# Patient Record
Sex: MALE | NOT HISPANIC OR LATINO | URBAN - NONMETROPOLITAN AREA
[De-identification: names, ages, dates, MRNs, and addresses within clinical notes are randomized per-mention and may not be internally consistent; named-entity substitution may affect disease eponyms.]

---

## 2018-08-08 ENCOUNTER — OFFICE VISIT (OUTPATIENT)
Dept: URBAN - NONMETROPOLITAN AREA CLINIC 18 | Facility: CLINIC | Age: 63
End: 2018-08-08
Payer: COMMERCIAL

## 2018-08-08 DIAGNOSIS — H16.143 PUNCTATE KERATITIS, BILATERAL: ICD-10-CM

## 2018-08-08 DIAGNOSIS — H43.393 OTHER VITREOUS OPACITIES, BILATERAL: ICD-10-CM

## 2018-08-08 PROCEDURE — 92004 COMPRE OPH EXAM NEW PT 1/>: CPT | Performed by: OPTOMETRIST

## 2018-08-08 ASSESSMENT — INTRAOCULAR PRESSURE
OS: 14
OD: 14

## 2018-08-08 NOTE — IMPRESSION/PLAN
Impression: Type 2 diab w mild nonprlf diabetic rtnop w/o macular edema, bilateral: G55.7760. Plan: Reviewed with patient that mild retinal vascular changes are occurring from diabetes. These changes do not require treatment at this time. Will continue to observe with dilated examination in 12 months.

## 2019-10-01 ENCOUNTER — OFFICE VISIT (OUTPATIENT)
Dept: URBAN - NONMETROPOLITAN AREA CLINIC 18 | Facility: CLINIC | Age: 64
End: 2019-10-01
Payer: COMMERCIAL

## 2019-10-01 DIAGNOSIS — H35.3131 NONEXUDATIVE AGE-RELATED MACULAR DEGENERATION, BILATERAL, EARLY DRY STAGE: ICD-10-CM

## 2019-10-01 DIAGNOSIS — H52.03 HYPERMETROPIA, BILATERAL: ICD-10-CM

## 2019-10-01 DIAGNOSIS — E11.3293 TYPE 2 DIAB W MILD NONPRLF DIABETIC RTNOP W/O MACULAR EDEMA, BILATERAL: Primary | ICD-10-CM

## 2019-10-01 PROCEDURE — 99214 OFFICE O/P EST MOD 30 MIN: CPT | Performed by: OPTOMETRIST

## 2019-10-01 ASSESSMENT — VISUAL ACUITY
OS: 20/20
OD: 20/20

## 2019-10-01 ASSESSMENT — INTRAOCULAR PRESSURE
OS: 14
OD: 14

## 2019-10-01 NOTE — IMPRESSION/PLAN
Impression: Type 2 diab w mild nonprlf diabetic rtnop w/o macular edema, bilateral: E51.2976. Plan: Reviewed with patient that mild retinal vascular changes are occurring from diabetes. These changes do not require treatment at this time. Will continue to observe with dilated examination in 12 months.

## 2019-10-01 NOTE — IMPRESSION/PLAN
Impression: Hypermetropia, bilateral: H52.03. Plan: Discussed glasses prescription at patient discretion.

## 2019-10-09 ENCOUNTER — PROCEDURE (OUTPATIENT)
Dept: URBAN - NONMETROPOLITAN AREA CLINIC 18 | Facility: CLINIC | Age: 64
End: 2019-10-09

## 2019-10-09 ASSESSMENT — VISUAL ACUITY
OD: 20/20
OS: 20/20

## 2020-10-02 ENCOUNTER — OFFICE VISIT (OUTPATIENT)
Dept: URBAN - NONMETROPOLITAN AREA CLINIC 18 | Facility: CLINIC | Age: 65
End: 2020-10-02
Payer: MEDICARE

## 2020-10-02 DIAGNOSIS — H17.89 OTHER CORNEAL SCAR: ICD-10-CM

## 2020-10-02 DIAGNOSIS — E11.9 TYPE 2 DIABETES MELLITUS W/O COMPLICATION: Primary | Chronic | ICD-10-CM

## 2020-10-02 DIAGNOSIS — H25.13 AGE-RELATED NUCLEAR CATARACT, BILATERAL: ICD-10-CM

## 2020-10-02 PROCEDURE — 99214 OFFICE O/P EST MOD 30 MIN: CPT | Performed by: OPTOMETRIST

## 2020-10-02 ASSESSMENT — INTRAOCULAR PRESSURE
OS: 14
OD: 14

## 2020-10-02 NOTE — IMPRESSION/PLAN
Impression: Type 2 diabetes mellitus w/o complication: F60.6. Plan: Reviewed with patient that no retinal vascular changes are occurring from diabetes. Patient to continue care with current medication provider for diabetes management. Importance of retinal exams reviewed. Will continue to observe with dilated examination in 12 months. nontender.../soft

## 2021-10-29 ENCOUNTER — OFFICE VISIT (OUTPATIENT)
Dept: URBAN - NONMETROPOLITAN AREA CLINIC 18 | Facility: CLINIC | Age: 66
End: 2021-10-29
Payer: MEDICARE

## 2021-10-29 PROCEDURE — 99213 OFFICE O/P EST LOW 20 MIN: CPT | Performed by: OPTOMETRIST

## 2021-10-29 ASSESSMENT — INTRAOCULAR PRESSURE
OD: 15
OS: 15

## 2021-10-29 ASSESSMENT — KERATOMETRY
OS: 44.00
OD: 43.25

## 2021-10-29 ASSESSMENT — VISUAL ACUITY
OS: 20/20
OD: 20/20

## 2021-10-29 NOTE — IMPRESSION/PLAN
Impression: Type 2 diabetes mellitus w/o complication: G77.8. Plan: Reviewed with patient that no retinal vascular changes are occurring from diabetes. Patient to continue care with current medication provider for diabetes management. Importance of retinal exams reviewed. Will continue to observe with dilated examination in 12 months.

## 2021-11-16 ENCOUNTER — OFFICE VISIT (OUTPATIENT)
Dept: URBAN - NONMETROPOLITAN AREA CLINIC 18 | Facility: CLINIC | Age: 66
End: 2021-11-16
Payer: COMMERCIAL

## 2021-11-16 PROCEDURE — 92014 COMPRE OPH EXAM EST PT 1/>: CPT | Performed by: OPTOMETRIST

## 2021-11-16 ASSESSMENT — VISUAL ACUITY
OD: 20/20
OS: 20/20

## 2021-11-16 ASSESSMENT — INTRAOCULAR PRESSURE
OD: 15
OS: 15

## 2022-11-17 ENCOUNTER — OFFICE VISIT (OUTPATIENT)
Dept: URBAN - NONMETROPOLITAN AREA CLINIC 18 | Facility: CLINIC | Age: 67
End: 2022-11-17
Payer: MEDICARE

## 2022-11-17 DIAGNOSIS — H43.393 OTHER VITREOUS OPACITIES, BILATERAL: ICD-10-CM

## 2022-11-17 DIAGNOSIS — E11.3291 TYPE 2 DIAB W MILD NONPRLF DIABETIC RTNOP W/O MACULAR EDEMA, RIGHT EYE: ICD-10-CM

## 2022-11-17 DIAGNOSIS — H25.13 AGE-RELATED NUCLEAR CATARACT, BILATERAL: ICD-10-CM

## 2022-11-17 DIAGNOSIS — H40.033 ANATOMICAL NARROW ANGLE, BILATERAL: ICD-10-CM

## 2022-11-17 DIAGNOSIS — E11.3392 TYPE 2 DIAB W MODERATE NONPRLF DIAB RTNOP W/O MACULAR EDEMA, LEFT EYE: Primary | ICD-10-CM

## 2022-11-17 DIAGNOSIS — H17.89 OTHER CORNEAL SCAR: ICD-10-CM

## 2022-11-17 PROCEDURE — 99214 OFFICE O/P EST MOD 30 MIN: CPT | Performed by: OPTOMETRIST

## 2022-11-17 PROCEDURE — 92020 GONIOSCOPY: CPT | Performed by: OPTOMETRIST

## 2022-11-17 ASSESSMENT — VISUAL ACUITY
OS: 20/20
OD: 20/20

## 2022-11-17 ASSESSMENT — INTRAOCULAR PRESSURE
OS: 14
OD: 15

## 2022-11-17 NOTE — IMPRESSION/PLAN
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 Plan: See above.

## 2022-11-17 NOTE — IMPRESSION/PLAN
Impression: Type 2 diab w moderate nonprlf diab rtnop w/o macular edema, left eye: O29.9246. Plan: Reviewed with patient that moderate diabetic retinal vascular changes are occurring from diabetes. These changes do not require treatment at this time, however, the patient is aware that the condition can progress and may require additional treatment in the future. Systemic monitoring with PCP/endocrinologist is recommended to decrease risk of damage from diabetes. Will continue to observe with dilated examination in 9 months.

## 2022-11-17 NOTE — IMPRESSION/PLAN
Impression: Anatomical narrow angle, bilateral: H40.033. Plan: Signs and symptoms of angle closure reviewed with patient in detail. Gonio today. Risk of potential irreversible damage to optic nerve was discussed. These symptoms include: increased pain, redness, or vision changes. Patient educated to contact clinic immediately if any new pain, redness or changes in vision experienced for evaluation.

## 2023-08-29 ENCOUNTER — OFFICE VISIT (OUTPATIENT)
Dept: URBAN - NONMETROPOLITAN AREA CLINIC 18 | Facility: CLINIC | Age: 68
End: 2023-08-29
Payer: COMMERCIAL

## 2023-08-29 DIAGNOSIS — E11.3393 TYPE 2 DIAB W MODERATE NONPRLF DIAB RTNOP W/O MACULAR EDEMA, BILATERAL: Primary | ICD-10-CM

## 2023-08-29 DIAGNOSIS — H25.13 AGE-RELATED NUCLEAR CATARACT, BILATERAL: ICD-10-CM

## 2023-08-29 DIAGNOSIS — H40.033 ANATOMICAL NARROW ANGLE, BILATERAL: ICD-10-CM

## 2023-08-29 DIAGNOSIS — H17.89 OTHER CORNEAL SCAR: ICD-10-CM

## 2023-08-29 DIAGNOSIS — H04.123 DRY EYE SYNDROME OF BILATERAL LACRIMAL GLANDS: ICD-10-CM

## 2023-08-29 DIAGNOSIS — H43.393 OTHER VITREOUS OPACITIES, BILATERAL: ICD-10-CM

## 2023-08-29 PROCEDURE — 99213 OFFICE O/P EST LOW 20 MIN: CPT | Performed by: OPTOMETRIST

## 2023-08-29 PROCEDURE — 92250 FUNDUS PHOTOGRAPHY W/I&R: CPT | Performed by: OPTOMETRIST

## 2023-08-29 ASSESSMENT — INTRAOCULAR PRESSURE
OS: 14
OD: 15
OS: 13

## 2024-05-29 ENCOUNTER — OFFICE VISIT (OUTPATIENT)
Dept: URBAN - NONMETROPOLITAN AREA CLINIC 18 | Facility: CLINIC | Age: 69
End: 2024-05-29
Payer: COMMERCIAL

## 2024-05-29 DIAGNOSIS — H40.033 ANATOMICAL NARROW ANGLE, BILATERAL: ICD-10-CM

## 2024-05-29 DIAGNOSIS — H25.13 AGE-RELATED NUCLEAR CATARACT, BILATERAL: ICD-10-CM

## 2024-05-29 DIAGNOSIS — H17.89 OTHER CORNEAL SCAR: ICD-10-CM

## 2024-05-29 DIAGNOSIS — E11.3393 TYPE 2 DIAB W MODERATE NONPRLF DIAB RTNOP W/O MACULAR EDEMA, BILATERAL: Primary | ICD-10-CM

## 2024-05-29 DIAGNOSIS — H43.393 OTHER VITREOUS OPACITIES, BILATERAL: ICD-10-CM

## 2024-05-29 DIAGNOSIS — H52.03 HYPERMETROPIA, BILATERAL: ICD-10-CM

## 2024-05-29 PROCEDURE — 92134 CPTRZ OPH DX IMG PST SGM RTA: CPT | Performed by: OPTOMETRIST

## 2024-05-29 PROCEDURE — 92014 COMPRE OPH EXAM EST PT 1/>: CPT | Performed by: OPTOMETRIST

## 2024-05-29 ASSESSMENT — INTRAOCULAR PRESSURE
OS: 10
OD: 10

## 2024-05-29 ASSESSMENT — VISUAL ACUITY
OD: 20/20
OS: 20/20

## 2025-02-11 NOTE — IMPRESSION/PLAN
Impression: Nonexudative age-related macular degeneration, bilateral, early dry stage: H35.3131. Plan: Discussed condition in detail with patient. Patient knows to return to clinic immediately if any changes in vision are experienced. Render Risk Assessment In Note?: no Comment: Patient just finished treating forehead, temples and scalp with 5fu/calcipotrine bid x 5 days. Moderate reaction \\n\\n\\nPatient previously applied skin medicinals 5FU/calcipotriene compound in JAN 2023 to crown and scalp vertex, BID x 5 days Detail Level: Zone

## 2025-02-25 ENCOUNTER — OFFICE VISIT (OUTPATIENT)
Dept: URBAN - NONMETROPOLITAN AREA CLINIC 18 | Facility: CLINIC | Age: 70
End: 2025-02-25
Payer: COMMERCIAL

## 2025-02-25 DIAGNOSIS — H17.89 OTHER CORNEAL SCAR: ICD-10-CM

## 2025-02-25 DIAGNOSIS — H40.033 ANATOMICAL NARROW ANGLE, BILATERAL: ICD-10-CM

## 2025-02-25 DIAGNOSIS — E11.3393 TYPE 2 DIABETES MELLITUS WITH MODERATE NONPROLIFERATIVE DIABETIC RETINOPATHY WITHOUT MACULAR EDEMA, BILATERAL: Primary | ICD-10-CM

## 2025-02-25 DIAGNOSIS — H43.393 OTHER VITREOUS OPACITIES, BILATERAL: ICD-10-CM

## 2025-02-25 DIAGNOSIS — H25.13 AGE-RELATED NUCLEAR CATARACT, BILATERAL: ICD-10-CM

## 2025-02-25 PROCEDURE — 99213 OFFICE O/P EST LOW 20 MIN: CPT | Performed by: OPTOMETRIST

## 2025-02-25 PROCEDURE — 92250 FUNDUS PHOTOGRAPHY W/I&R: CPT | Performed by: OPTOMETRIST

## 2025-02-25 ASSESSMENT — INTRAOCULAR PRESSURE
OD: 16
OS: 16

## 2025-08-26 ENCOUNTER — OFFICE VISIT (OUTPATIENT)
Dept: URBAN - NONMETROPOLITAN AREA CLINIC 18 | Facility: CLINIC | Age: 70
End: 2025-08-26
Payer: COMMERCIAL

## 2025-08-26 DIAGNOSIS — H40.033 ANATOMICAL NARROW ANGLE, BILATERAL: ICD-10-CM

## 2025-08-26 DIAGNOSIS — H43.393 OTHER VITREOUS OPACITIES, BILATERAL: ICD-10-CM

## 2025-08-26 DIAGNOSIS — H17.89 OTHER CORNEAL SCAR: ICD-10-CM

## 2025-08-26 DIAGNOSIS — H25.13 AGE-RELATED NUCLEAR CATARACT, BILATERAL: ICD-10-CM

## 2025-08-26 DIAGNOSIS — E11.3393 TYPE 2 DIAB W MODERATE NONPRLF DIAB RTNOP W/O MACULAR EDEMA, BILATERAL: Primary | ICD-10-CM

## 2025-08-26 PROCEDURE — 92020 GONIOSCOPY: CPT | Performed by: OPTOMETRIST

## 2025-08-26 PROCEDURE — 99213 OFFICE O/P EST LOW 20 MIN: CPT | Performed by: OPTOMETRIST

## 2025-08-26 PROCEDURE — 92250 FUNDUS PHOTOGRAPHY W/I&R: CPT | Performed by: OPTOMETRIST

## 2025-08-26 ASSESSMENT — INTRAOCULAR PRESSURE
OS: 10
OD: 10

## 2025-08-26 ASSESSMENT — VISUAL ACUITY
OD: 20/20
OS: 20/20